# Patient Record
Sex: MALE | Race: WHITE | Employment: FULL TIME | URBAN - METROPOLITAN AREA
[De-identification: names, ages, dates, MRNs, and addresses within clinical notes are randomized per-mention and may not be internally consistent; named-entity substitution may affect disease eponyms.]

---

## 2022-12-20 ENCOUNTER — HOSPITAL ENCOUNTER (EMERGENCY)
Age: 42
Discharge: HOME | End: 2022-12-20
Attending: STUDENT IN AN ORGANIZED HEALTH CARE EDUCATION/TRAINING PROGRAM
Payer: COMMERCIAL

## 2022-12-20 ENCOUNTER — APPOINTMENT (OUTPATIENT)
Dept: GENERAL RADIOLOGY | Age: 42
End: 2022-12-20
Attending: STUDENT IN AN ORGANIZED HEALTH CARE EDUCATION/TRAINING PROGRAM
Payer: COMMERCIAL

## 2022-12-20 ENCOUNTER — APPOINTMENT (OUTPATIENT)
Dept: ULTRASOUND IMAGING | Age: 42
End: 2022-12-20
Attending: STUDENT IN AN ORGANIZED HEALTH CARE EDUCATION/TRAINING PROGRAM
Payer: COMMERCIAL

## 2022-12-20 VITALS
RESPIRATION RATE: 18 BRPM | HEART RATE: 94 BPM | BODY MASS INDEX: 39.24 KG/M2 | DIASTOLIC BLOOD PRESSURE: 109 MMHG | OXYGEN SATURATION: 96 % | WEIGHT: 250 LBS | HEIGHT: 67 IN | SYSTOLIC BLOOD PRESSURE: 174 MMHG

## 2022-12-20 DIAGNOSIS — I10 HYPERTENSION, UNSPECIFIED TYPE: ICD-10-CM

## 2022-12-20 DIAGNOSIS — M25.512 PAIN OF BOTH SHOULDER JOINTS: ICD-10-CM

## 2022-12-20 DIAGNOSIS — R11.0 NAUSEA WITHOUT VOMITING: Primary | ICD-10-CM

## 2022-12-20 DIAGNOSIS — K76.0 HEPATIC STEATOSIS: ICD-10-CM

## 2022-12-20 DIAGNOSIS — M25.511 PAIN OF BOTH SHOULDER JOINTS: ICD-10-CM

## 2022-12-20 LAB
ALBUMIN SERPL-MCNC: 3.7 G/DL (ref 3.5–5)
ALBUMIN/GLOB SERPL: 0.9 {RATIO} (ref 1.1–2.2)
ALP SERPL-CCNC: 119 U/L (ref 45–117)
ALT SERPL-CCNC: 83 U/L (ref 12–78)
ANION GAP SERPL CALC-SCNC: 7 MMOL/L (ref 5–15)
AST SERPL-CCNC: 46 U/L (ref 15–37)
ATRIAL RATE: 94 BPM
BASOPHILS # BLD: 0 K/UL (ref 0–0.1)
BASOPHILS NFR BLD: 0 % (ref 0–1)
BILIRUB SERPL-MCNC: 0.7 MG/DL (ref 0.2–1)
BUN SERPL-MCNC: 12 MG/DL (ref 6–20)
BUN/CREAT SERPL: 14 (ref 12–20)
CALCIUM SERPL-MCNC: 9.1 MG/DL (ref 8.5–10.1)
CALCULATED P AXIS, ECG09: 53 DEGREES
CALCULATED R AXIS, ECG10: -13 DEGREES
CHLORIDE SERPL-SCNC: 105 MMOL/L (ref 97–108)
CO2 SERPL-SCNC: 27 MMOL/L (ref 21–32)
COMMENT, HOLDF: NORMAL
CREAT SERPL-MCNC: 0.86 MG/DL (ref 0.7–1.3)
DIAGNOSIS, 93000: NORMAL
DIFFERENTIAL METHOD BLD: ABNORMAL
EOSINOPHIL # BLD: 0 K/UL (ref 0–0.4)
EOSINOPHIL NFR BLD: 1 % (ref 0–7)
ERYTHROCYTE [DISTWIDTH] IN BLOOD BY AUTOMATED COUNT: 12.7 % (ref 11.5–14.5)
GLOBULIN SER CALC-MCNC: 4.3 G/DL (ref 2–4)
GLUCOSE SERPL-MCNC: 208 MG/DL (ref 65–100)
HCT VFR BLD AUTO: 50.9 % (ref 36.6–50.3)
HGB BLD-MCNC: 17.1 G/DL (ref 12.1–17)
IMM GRANULOCYTES # BLD AUTO: 0 K/UL (ref 0–0.04)
IMM GRANULOCYTES NFR BLD AUTO: 0 % (ref 0–0.5)
LYMPHOCYTES # BLD: 1 K/UL (ref 0.8–3.5)
LYMPHOCYTES NFR BLD: 13 % (ref 12–49)
MCH RBC QN AUTO: 28.9 PG (ref 26–34)
MCHC RBC AUTO-ENTMCNC: 33.6 G/DL (ref 30–36.5)
MCV RBC AUTO: 86.1 FL (ref 80–99)
MONOCYTES # BLD: 0.4 K/UL (ref 0–1)
MONOCYTES NFR BLD: 6 % (ref 5–13)
NEUTS SEG # BLD: 6.1 K/UL (ref 1.8–8)
NEUTS SEG NFR BLD: 80 % (ref 32–75)
NRBC # BLD: 0 K/UL (ref 0–0.01)
NRBC BLD-RTO: 0 PER 100 WBC
P-R INTERVAL, ECG05: 142 MS
PLATELET # BLD AUTO: 207 K/UL (ref 150–400)
PMV BLD AUTO: 10.5 FL (ref 8.9–12.9)
POTASSIUM SERPL-SCNC: 3.9 MMOL/L (ref 3.5–5.1)
PROT SERPL-MCNC: 8 G/DL (ref 6.4–8.2)
Q-T INTERVAL, ECG07: 370 MS
QRS DURATION, ECG06: 92 MS
QTC CALCULATION (BEZET), ECG08: 462 MS
RBC # BLD AUTO: 5.91 M/UL (ref 4.1–5.7)
SAMPLES BEING HELD,HOLD: NORMAL
SODIUM SERPL-SCNC: 139 MMOL/L (ref 136–145)
TROPONIN-HIGH SENSITIVITY: 14 NG/L (ref 0–76)
TROPONIN-HIGH SENSITIVITY: 15 NG/L (ref 0–76)
VENTRICULAR RATE, ECG03: 94 BPM
WBC # BLD AUTO: 7.6 K/UL (ref 4.1–11.1)

## 2022-12-20 PROCEDURE — 93005 ELECTROCARDIOGRAM TRACING: CPT

## 2022-12-20 PROCEDURE — 96374 THER/PROPH/DIAG INJ IV PUSH: CPT

## 2022-12-20 PROCEDURE — 71045 X-RAY EXAM CHEST 1 VIEW: CPT

## 2022-12-20 PROCEDURE — 76705 ECHO EXAM OF ABDOMEN: CPT

## 2022-12-20 PROCEDURE — 99285 EMERGENCY DEPT VISIT HI MDM: CPT

## 2022-12-20 PROCEDURE — 36415 COLL VENOUS BLD VENIPUNCTURE: CPT

## 2022-12-20 PROCEDURE — 85025 COMPLETE CBC W/AUTO DIFF WBC: CPT

## 2022-12-20 PROCEDURE — 84484 ASSAY OF TROPONIN QUANT: CPT

## 2022-12-20 PROCEDURE — 74011250636 HC RX REV CODE- 250/636: Performed by: STUDENT IN AN ORGANIZED HEALTH CARE EDUCATION/TRAINING PROGRAM

## 2022-12-20 PROCEDURE — 80053 COMPREHEN METABOLIC PANEL: CPT

## 2022-12-20 RX ORDER — ONDANSETRON 2 MG/ML
4 INJECTION INTRAMUSCULAR; INTRAVENOUS ONCE
Status: COMPLETED | OUTPATIENT
Start: 2022-12-20 | End: 2022-12-20

## 2022-12-20 RX ORDER — HYDROCHLOROTHIAZIDE 25 MG/1
25 TABLET ORAL DAILY
Qty: 30 TABLET | Refills: 0 | Status: SHIPPED | OUTPATIENT
Start: 2022-12-20 | End: 2023-01-19

## 2022-12-20 RX ADMIN — ONDANSETRON 4 MG: 2 INJECTION INTRAMUSCULAR; INTRAVENOUS at 10:59

## 2022-12-20 NOTE — DISCHARGE INSTRUCTIONS
Start new blood pressure medication. Please follow-up with your primary doctor for continued evaluation and treatment of your blood pressure. Your labs today showed elevated liver enzymes and US showed fatty liver disease, please follow-up with your primary doctor for further monitoring and treatment. Return to the emergency department if you have chest pain, difficulty breathing, if you have pain associated with nausea and vomiting, sweating, or passout, any other concerns or problems.

## 2022-12-20 NOTE — ED TRIAGE NOTES
Pt arrives to the ER for complaints of right and left shoulder pain, slight shortness of breath, hypertension and indigestion. Symptoms started about 1 hour to 30 minutes ago. Denies chest pain, abdominal pain or vomiting. Pt reports his BP was 186/120. PMH of hypertension and noncompliance with BP medications.

## 2022-12-20 NOTE — ED PROVIDER NOTES
[facility]     Date of Service:  12/20/2022    Patient:  Allyson Hunter. Chief Complaint:  Hypertension, Shoulder Pain, and Epigastric Pain       HPI:  Allyson Nunez is a 38 y.o.  male with a past medical history of HTN (medication noncompliance) who presents for evaluation of shoulder pain and nausea. Patient reports while he was at the grocery store, he developed bilateral shoulder pain. He has associated nausea. Symptoms started approximately 1.5 hours prior to arrival.  Describes the shoulder pain as aching and worse with movement. He does endorse improvement of the pain with rest, but continued nausea. Also endorses having associated chest discomfort described as \"indigestion\" and pressure. He endorses associated shortness of breath but no diaphoresis or vomiting. Denies any other recent illness including fevers, chills, cough or congestion. No vomiting or diarrhea. Of note, patient does have high blood pressure, is supposed to be on losartan but has not taken it in over 2 years due to side effects. Past medical history: Hypertension    Medications: None    Allergies: None    Social history: No smoking. . Family history: Father with CAD in his 63's    No past surgical history on file. No family history on file.     Social History     Socioeconomic History    Marital status: Not on file     Spouse name: Not on file    Number of children: Not on file    Years of education: Not on file    Highest education level: Not on file   Occupational History    Not on file   Tobacco Use    Smoking status: Not on file    Smokeless tobacco: Not on file   Substance and Sexual Activity    Alcohol use: Not on file    Drug use: Not on file    Sexual activity: Not on file   Other Topics Concern    Not on file   Social History Narrative    Not on file     Social Determinants of Health     Financial Resource Strain: Not on file   Food Insecurity: Not on file   Transportation Needs: Not on file   Physical Activity: Not on file   Stress: Not on file   Social Connections: Not on file   Intimate Partner Violence: Not on file   Housing Stability: Not on file         ALLERGIES: Patient has no known allergies. Review of Systems   Constitutional:  Negative for chills and fever. HENT:  Negative for congestion and rhinorrhea. Eyes:  Negative for discharge and redness. Respiratory:  Positive for shortness of breath. Negative for cough. Cardiovascular:  Positive for chest pain. Gastrointestinal:  Positive for nausea. Negative for abdominal pain, diarrhea and vomiting. Genitourinary:  Negative for hematuria. Musculoskeletal:  Positive for arthralgias. Negative for back pain and neck stiffness. Skin:  Negative for rash and wound. Allergic/Immunologic: Negative for environmental allergies and food allergies. Neurological:  Negative for speech difficulty and headaches. Psychiatric/Behavioral:  Negative for agitation and confusion. Vitals:    12/20/22 1023   BP: (!) 174/109   Pulse: 94   Resp: 18   SpO2: 96%   Weight: 113.4 kg (250 lb)   Height: 5' 7\" (1.702 m)            Physical Exam  Vitals and nursing note reviewed. Constitutional:       General: He is not in acute distress. Appearance: Normal appearance. He is obese. He is not ill-appearing or toxic-appearing. HENT:      Head: Normocephalic and atraumatic. Eyes:      General: No scleral icterus. Conjunctiva/sclera: Conjunctivae normal.   Cardiovascular:      Rate and Rhythm: Normal rate and regular rhythm. Pulses: Normal pulses. Pulmonary:      Effort: Pulmonary effort is normal. No respiratory distress. Abdominal:      Palpations: Abdomen is soft. Tenderness: There is no abdominal tenderness. There is no guarding or rebound. Musculoskeletal:         General: No swelling or deformity. Normal range of motion. Skin:     General: Skin is warm and dry. Capillary Refill: Capillary refill takes less than 2 seconds. Neurological:      General: No focal deficit present. Mental Status: He is alert and oriented to person, place, and time. Psychiatric:         Mood and Affect: Mood normal.         Behavior: Behavior normal.        MDM  Number of Diagnoses or Management Options  Hepatic steatosis  Hypertension, unspecified type  Nausea without vomiting  Pain of both shoulder joints  Diagnosis management comments:     DECISION MAKING:  Paresh King is a 38 y.o. male who comes in as above. On arrival patient's blood pressure is 174/109, vital signs otherwise stable. On my examination he is well-appearing and nontoxic. ECG on arrival without any acute ischemic changes. Labs are notable for a glucose of 208. Electrolytes and kidney function are stable. LFTs are elevated, ALT of 83 and AST of 46. Bilirubin is within normal limits. High-sensitivity troponin is within normal limits at 15 and on repeat delta troponin 14. Based on presentation, history, no acute ischemic changes on ECG, negative troponin x 2 and Heart score of 2 I feel patient can follow-up as an outpatient for further evaluation. Given symptoms with associated shoulder pain and some epigastric pain with his elevated LFTs will evaluate with an ultrasound of the gallbladder. Ultrasound of the right upper quadrant without acute process of the gallbladder but there are findings compatible with hepatic steatosis. On reevaluation, patient is resting comfortably. I discussed results with patient and family at bedside. Patient's blood pressure does remain elevated and I discussed at length with patient importance of blood pressure control. He was previously on losartan for his blood pressure but did not like side effects so he discontinued taking it. Discussed starting patient on HCTZ and follow-up with his primary care doctor for blood pressure monitoring and further medication management. He will also be referred to cardiology.   Patient was given follow-up needs and return precautions, verbalized understanding. Patient will be discharged home. ED Course as of 12/22/22 1010   Tue Dec 20, 2022   1052 EKG, 12 LEAD, INITIAL  ECG at 10:18 AM, interpreted by me: Normal sinus rhythm, rate 94 bpm.  Normal axis. Normal intervals. Incomplete right bundle branch block. No ST elevations. [SH]      ED Course User Index  [SH] MaddyRamsey Thiago, DO       Procedures      LABS:  Recent Results (from the past 50 hour(s))   EKG, 12 LEAD, INITIAL    Collection Time: 12/20/22 10:18 AM   Result Value Ref Range    Ventricular Rate 94 BPM    Atrial Rate 94 BPM    P-R Interval 142 ms    QRS Duration 92 ms    Q-T Interval 370 ms    QTC Calculation (Bezet) 462 ms    Calculated P Axis 53 degrees    Calculated R Axis -13 degrees    Diagnosis       Normal sinus rhythm  RSR' pattern in V1  Minimal voltage criteria for LVH, may be normal variant ( R in aVL )  Borderline ECG  No previous ECGs available  Confirmed by SHERLEY Esqueda MD ([id number]) on 12/20/2022 2:35:17 PM     CBC WITH AUTOMATED DIFF    Collection Time: 12/20/22 10:27 AM   Result Value Ref Range    WBC 7.6 4.1 - 11.1 K/uL    RBC 5.91 (H) 4.10 - 5.70 M/uL    HGB 17.1 (H) 12.1 - 17.0 g/dL    HCT 50.9 (H) 36.6 - 50.3 %    MCV 86.1 80.0 - 99.0 FL    MCH 28.9 26.0 - 34.0 PG    MCHC 33.6 30.0 - 36.5 g/dL    RDW 12.7 11.5 - 14.5 %    PLATELET 076 546 - 633 K/uL    MPV 10.5 8.9 - 12.9 FL    NRBC 0.0 0  WBC    ABSOLUTE NRBC 0.00 0.00 - 0.01 K/uL    NEUTROPHILS 80 (H) 32 - 75 %    LYMPHOCYTES 13 12 - 49 %    MONOCYTES 6 5 - 13 %    EOSINOPHILS 1 0 - 7 %    BASOPHILS 0 0 - 1 %    IMMATURE GRANULOCYTES 0 0.0 - 0.5 %    ABS. NEUTROPHILS 6.1 1.8 - 8.0 K/UL    ABS. LYMPHOCYTES 1.0 0.8 - 3.5 K/UL    ABS. MONOCYTES 0.4 0.0 - 1.0 K/UL    ABS. EOSINOPHILS 0.0 0.0 - 0.4 K/UL    ABS. BASOPHILS 0.0 0.0 - 0.1 K/UL    ABS. IMM.  GRANS. 0.0 0.00 - 0.04 K/UL    DF AUTOMATED     METABOLIC PANEL, COMPREHENSIVE    Collection Time: 12/20/22 10:27 AM   Result Value Ref Range    Sodium 139 136 - 145 mmol/L    Potassium 3.9 3.5 - 5.1 mmol/L    Chloride 105 97 - 108 mmol/L    CO2 27 21 - 32 mmol/L    Anion gap 7 5 - 15 mmol/L    Glucose 208 (H) 65 - 100 mg/dL    BUN 12 6 - 20 MG/DL    Creatinine 0.86 0.70 - 1.30 MG/DL    BUN/Creatinine ratio 14 12 - 20      eGFR >60 >60 ml/min/1.73m2    Calcium 9.1 8.5 - 10.1 MG/DL    Bilirubin, total 0.7 0.2 - 1.0 MG/DL    ALT (SGPT) 83 (H) 12 - 78 U/L    AST (SGOT) 46 (H) 15 - 37 U/L    Alk. phosphatase 119 (H) 45 - 117 U/L    Protein, total 8.0 6.4 - 8.2 g/dL    Albumin 3.7 3.5 - 5.0 g/dL    Globulin 4.3 (H) 2.0 - 4.0 g/dL    A-G Ratio 0.9 (L) 1.1 - 2.2     SAMPLES BEING HELD    Collection Time: 12/20/22 10:27 AM   Result Value Ref Range    SAMPLES BEING HELD 1BLUE,1SST,1RED     COMMENT        Add-on orders for these samples will be processed based on acceptable specimen integrity and analyte stability, which may vary by analyte. TROPONIN-HIGH SENSITIVITY    Collection Time: 12/20/22 10:27 AM   Result Value Ref Range    Troponin-High Sensitivity 15 0 - 76 ng/L   TROPONIN-HIGH SENSITIVITY    Collection Time: 12/20/22 12:17 PM   Result Value Ref Range    Troponin-High Sensitivity 14 0 - 76 ng/L        IMAGING:  US ABD LTD   Final Result   Increased hepatic parenchymal echotexture compatible with hepatic   steatosis. XR CHEST PORT   Final Result   No Acute Disease. Medications During Visit:  Medications   ondansetron (ZOFRAN) injection 4 mg (4 mg IntraVENous Given 12/20/22 1059)         IMPRESSION:  1. Nausea without vomiting    2. Hypertension, unspecified type    3. Pain of both shoulder joints    4.  Hepatic steatosis        DISPOSITION:  Discharged      Discharge Medication List as of 12/20/2022  1:33 PM           Follow-up Information       Follow up With Specialties Details Why Contact Michelle Leslie MD Family Medicine Schedule an appointment as soon as possible for a visit   [address] [place] [address] [facility]  [address] [facility]  [phone]      [facility] EMERGENCY DEPT Emergency Medicine  If symptoms worsen [address] [facility]    Leonides Pinon DO Cardiovascular Disease Physician, Interventional Cardiology Physician Schedule an appointment as soon as possible for a visit   [address]  [address] [facility]  [phone]                The patient is asked to follow-up with their primary care provider in the next several days. They are to call tomorrow for an appointment. The patient is asked to return promptly for any increased concerns or worsening of symptoms. They can return to this emergency department or any other emergency department.         Shannon Rinaldi DO